# Patient Record
Sex: MALE | Race: WHITE
[De-identification: names, ages, dates, MRNs, and addresses within clinical notes are randomized per-mention and may not be internally consistent; named-entity substitution may affect disease eponyms.]

---

## 2021-06-18 ENCOUNTER — HOSPITAL ENCOUNTER (EMERGENCY)
Dept: HOSPITAL 61 - ER | Age: 36
Discharge: HOME | End: 2021-06-18
Payer: SELF-PAY

## 2021-06-18 VITALS — WEIGHT: 250.45 LBS | HEIGHT: 75 IN | BODY MASS INDEX: 31.14 KG/M2

## 2021-06-18 VITALS — DIASTOLIC BLOOD PRESSURE: 84 MMHG | SYSTOLIC BLOOD PRESSURE: 139 MMHG
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
## 2021-06-18 DIAGNOSIS — F17.200: ICD-10-CM

## 2021-06-18 DIAGNOSIS — S82.141A: Primary | ICD-10-CM
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
## 2021-06-18 PROCEDURE — 29505 APPLICATION LONG LEG SPLINT: CPT

## 2021-06-18 PROCEDURE — 73562 X-RAY EXAM OF KNEE 3: CPT

## 2021-06-18 PROCEDURE — 99283 EMERGENCY DEPT VISIT LOW MDM: CPT

## 2021-06-18 NOTE — RAD
Three-view right knee study



Clinical indications: Fall. Right knee injury and pain.



FINDINGS: There is a nondisplaced articular surface fracture of the lateral tibial plateau. No disloc
ation is seen. No lytic process is evident. Distal femoral horizontal screw is apparent. A large knee
 joint effusion is seen.



IMPRESSION: Nondisplaced fracture of the lateral tibial plateau.



Electronically signed by: Bertram Bird MD (6/18/2021 1:46 PM) [id number]

## 2021-06-18 NOTE — PHYS DOC
Past Medical History


Past Medical History:  No Pertinent History


Past Surgical History:  Other


Additional Past Surgical Histo:  RIGHT KNEE ACL


Smoking Status:  Current Every Day Smoker


Alcohol Use:  None





General Adult


EDM:


Chief Complaint:  KNEE SWELLING





HPI:


HPI:





Patient is a 35  year old male who presented to ER for evaluation of right knee 

pain.  Patient said he was walking inside the house yesterday and the floor gave

out, patient right lower extremity went through the floor, he did not bend his 

knee.  Patient complained of right knee pain, denies any low back pain, denies 

any hip pain, denies any foot pain calcaneus pain.  Patient has history of right

knee ACL surgery done in the past, he had a pair crutches at home so he been 

using the crutches.





Review of Systems:


Review of Systems:


Constitutional:   Denies fever or chills. []


Eyes:   Denies change in visual acuity. []


HENT:   Denies nasal congestion or sore throat. [] 


Respiratory:   Denies cough or shortness of breath. [] 


Cardiovascular:   Denies chest pain or edema. [] 


GI:   Denies abdominal pain, nausea, vomiting, bloody stools or diarrhea. [] 


:  Denies dysuria. [] 


Musculoskeletal:   Positive for right knee pain, no low back pain, no pelvic 

pain, no heel pain for


Integument:   Denies rash. [] 


Neurologic:   Denies headache, focal weakness or sensory changes. [] 


Endocrine:   Denies polyuria or polydipsia. [] 


Lymphatic:  Denies swollen glands. [] 


Psychiatric:  Denies depression or anxiety. []





Heart Score:


C/O Chest Pain:  N/A


Risk Factors:


Risk Factors:  DM, Current or recent (<one month) smoker, HTN, HLP, family 

history of CAD, obesity.


Risk Scores:


Score 0 - 3:  2.5% MACE over next 6 weeks - Discharge Home


Score 4 - 6:  20.3% MACE over next 6 weeks - Admit for Clinical Observation


Score 7 - 10:  72.7% MACE over next 6 weeks - Early Invasive Strategies





Physical Exam:


PE:





Constitutional: Well developed, well nourished, no acute distress, non-toxic 

appearance. []





Skin: Warm, dry, no erythema, no rash. [] 


Back: No tenderness, no CVA tenderness. [] 


Extremities: Right knee appear to be swollen, tender to palpation at the right 

lateral collateral ligament area.  No hip tenderness to palpation,  No 

calcaneous tenderness to palpation.   


Neurologic: Alert and oriented X 3, normal motor function, normal sensory 

function, no focal deficits noted. []


Psychologic: Affect normal, judgement normal, mood normal. []





Current Patient Data:


Vital Signs:





                                   Vital Signs








  Date Time  Temp Pulse Resp B/P (MAP) Pulse Ox O2 Delivery O2 Flow Rate FiO2


 


21 12:49 98.2 84 18 139/84 (102) 97 Room Air  





 98.2       











EKG:


EKG:


[]





Radiology/Procedures:


Radiology/Procedures:


[][facility]


                    [address] [place] [address]


                                 [phone]


                                        


                                 IMAGING REPORT





                                     Signed





PATIENT: HAFSA REDDY   ACCOUNT: [id number]     MRN#: [id number]


: 1985           LOCATION: ER              AGE: 35


SEX: M                    EXAM DT: 21         ACCESSION#: [id number]


STATUS: REG ER            ORD. PHYSICIAN: PRETTY KAUFFMAN DO


REASON: fell, right knee injured


PROCEDURE: KNEE RIGHT 3V





Three-view right knee study





Clinical indications: Fall. Right knee injury and pain.





FINDINGS: There is a nondisplaced articular surface fracture of the lateral 

tibial plateau. No dislocation is seen. No lytic process is evident. Distal 

femoral horizontal screw is apparent. A large knee joint effusion is seen.





IMPRESSION: Nondisplaced fracture of the lateral tibial plateau.





Electronically signed by: Kendra Bird MD (2021 1:46 PM) [id number]














DICTATED and SIGNED BY:     KENDRA BIRD MD


DATE:     21 1728EVH2 0





Course & Med Decision Making:


Course & Med Decision Making


Pertinent Labs and Imaging studies reviewed. (See chart for details)





Patient is a 34-year-old male who was presented to ER for evaluation of right 

knee pain.  Patient fell through the floor yesterday, x-ray of right knee show 

nondisplaced lateral tibial plateau fracture.  Patient was advised to use 

crutches, a knee immobilizer was applied to the right knee.  Discussed with the 

orthopedic surgeon on-call Dr. Jeanette Johnson who recommended to send patient home 

and he will see the patient in the clinic next week.





Dragon Disclaimer:


Dragon Disclaimer:


This electronic medical record was generated, in whole or in part, using a voice

 recognition dictation system.





Departure


Departure


Impression:  


   Primary Impression:  


   Closed fracture of right tibial plateau


Disposition:   HOME / SELF CARE / HOMELESS


Condition:  STABLE


Referrals:  


NO PCP (PCP)








JEANETTE JOHNSON MD


Please call this orthopedic surgeon today for follow up next week.


Patient Instructions:  Tibial Plateau Fracture with [facility]hab[facility], Tibial 

Plateau Fracture, Undisplaced, Adult





Additional Instructions:  


use crutches to walk, keep knee immobilizer on when up and about.


Scripts


Tramadol Hcl (TRAMADOL HCL) 50 Mg Tablet


50 MG PO Q6HRS PRN for PAIN, #20 TAB


   Prov: PRETTY KAUFFMAN DO         21











PRETTY KAUFFMAN DO                2021 13:58